# Patient Record
Sex: FEMALE | Race: WHITE | Employment: PART TIME | URBAN - METROPOLITAN AREA
[De-identification: names, ages, dates, MRNs, and addresses within clinical notes are randomized per-mention and may not be internally consistent; named-entity substitution may affect disease eponyms.]

---

## 2021-01-22 ENCOUNTER — HOSPITAL ENCOUNTER (EMERGENCY)
Age: 31
Discharge: HOME | End: 2021-01-22
Attending: EMERGENCY MEDICINE
Payer: MEDICAID

## 2021-01-22 VITALS
RESPIRATION RATE: 16 BRPM | TEMPERATURE: 98.1 F | HEIGHT: 60 IN | DIASTOLIC BLOOD PRESSURE: 73 MMHG | HEART RATE: 74 BPM | WEIGHT: 223.11 LBS | BODY MASS INDEX: 43.8 KG/M2 | OXYGEN SATURATION: 99 % | SYSTOLIC BLOOD PRESSURE: 123 MMHG

## 2021-01-22 DIAGNOSIS — J06.9 VIRAL URI WITH COUGH: Primary | ICD-10-CM

## 2021-01-22 DIAGNOSIS — Z20.822 SUSPECTED COVID-19 VIRUS INFECTION: ICD-10-CM

## 2021-01-22 LAB — SARS-COV-2, COV2: NORMAL

## 2021-01-22 PROCEDURE — U0005 INFEC AGEN DETEC AMPLI PROBE: HCPCS

## 2021-01-22 PROCEDURE — 99282 EMERGENCY DEPT VISIT SF MDM: CPT

## 2021-01-22 PROCEDURE — U0003 INFECTIOUS AGENT DETECTION BY NUCLEIC ACID (DNA OR RNA); SEVERE ACUTE RESPIRATORY SYNDROME CORONAVIRUS 2 (SARS-COV-2) (CORONAVIRUS DISEASE [COVID-19]), AMPLIFIED PROBE TECHNIQUE, MAKING USE OF HIGH THROUGHPUT TECHNOLOGIES AS DESCRIBED BY CMS-2020-01-R: HCPCS

## 2021-01-22 NOTE — Clinical Note
[address] 
[facility] 
[address] [address] [phone] Work/School Note Date: 1/22/2021 To Whom It May concern: 
 
Francy Valdivia was evaulated by the following provider(s): 
Attending Provider: Ashley Fonseca [address] [place] [address] virus is suspected. Per the CDC guidelines we recommend home isolation until the following conditions are all met: 1. At least 10 days have passed since symptoms first appeared and 2. At least 24 hours have passed since last fever without the use of fever-reducing medications and 
3. Symptoms (e.g., cough, shortness of breath) have improved Sincerely, 
 
 
 
 
Alysa Arevalo MD

## 2021-01-22 NOTE — ED NOTES
Dr. Christiano Claros and I have reviewed discharge instructions with the patient. The patient verbalized understanding.

## 2021-01-22 NOTE — ED PROVIDER NOTES
The history is provided by the patient. No  was used. Cough  This is a new problem. The current episode started more than 2 days ago. The problem occurs every few hours. The problem has not changed since onset. There has been a fever of 100 - 100.9 F. The fever has been present for less than 1 day. Associated symptoms include chills, sweats, headaches, myalgias, shortness of breath and nausea. Pertinent negatives include no chest pain, no weight loss, no eye redness, no ear congestion, no ear pain, no rhinorrhea, no sore throat, no wheezing, no vomiting and no confusion. She has tried nothing for the symptoms. The treatment provided no relief. She is not a smoker. Her past medical history does not include bronchitis, pneumonia, bronchiectasis, COPD, emphysema, asthma, cancer, heart failure or CHF. History reviewed. No pertinent past medical history. Past Surgical History:   Procedure Laterality Date    HX GYN               History reviewed. No pertinent family history.     Social History     Socioeconomic History    Marital status: Not on file     Spouse name: Not on file    Number of children: Not on file    Years of education: Not on file    Highest education level: Not on file   Occupational History    Not on file   Social Needs    Financial resource strain: Not on file    Food insecurity     Worry: Not on file     Inability: Not on file    Transportation needs     Medical: Not on file     Non-medical: Not on file   Tobacco Use    Smoking status: Former Smoker    Smokeless tobacco: Never Used    Tobacco comment: stopped    Substance and Sexual Activity    Alcohol use: Not Currently    Drug use: Never    Sexual activity: Yes   Lifestyle    Physical activity     Days per week: Not on file     Minutes per session: Not on file    Stress: Not on file   Relationships    Social connections     Talks on phone: Not on file     Gets together: Not on file Attends Yazidism service: Not on file     Active member of club or organization: Not on file     Attends meetings of clubs or organizations: Not on file     Relationship status: Not on file    Intimate partner violence     Fear of current or ex partner: Not on file     Emotionally abused: Not on file     Physically abused: Not on file     Forced sexual activity: Not on file   Other Topics Concern    Not on file   Social History Narrative    Not on file         ALLERGIES: Patient has no known allergies. Review of Systems   Constitutional: Positive for chills. Negative for activity change, fever and weight loss. HENT: Negative for ear pain, nosebleeds, rhinorrhea, sore throat, trouble swallowing and voice change. Eyes: Negative for redness and visual disturbance. Respiratory: Positive for cough and shortness of breath. Negative for wheezing. Cardiovascular: Negative for chest pain and palpitations. Gastrointestinal: Positive for nausea. Negative for abdominal pain, constipation, diarrhea and vomiting. Genitourinary: Negative for difficulty urinating, dysuria, hematuria and urgency. Musculoskeletal: Positive for myalgias. Negative for back pain, neck pain and neck stiffness. Skin: Negative for color change. Allergic/Immunologic: Negative for immunocompromised state. Neurological: Positive for headaches. Negative for dizziness, seizures, syncope, weakness, light-headedness and numbness. Psychiatric/Behavioral: Negative for behavioral problems, confusion, hallucinations, self-injury and suicidal ideas. Vitals:    01/22/21 0940   BP: 123/73   Pulse: 74   Resp: 16   Temp: 98.1 °F (36.7 °C)   SpO2: 99%   Weight: 101.2 kg (223 lb 1.7 oz)   Height: 5' (1.524 m)            Physical Exam  Vitals signs and nursing note reviewed. Constitutional:       General: She is not in acute distress. Appearance: She is well-developed. She is not diaphoretic. HENT:      Head: Atraumatic.    Neck: Trachea: No tracheal deviation. Cardiovascular:      Rate and Rhythm: Normal rate and regular rhythm. Heart sounds: Normal heart sounds. Comments: Warm and well perfused  Pulmonary:      Effort: Pulmonary effort is normal. No respiratory distress. Breath sounds: Normal breath sounds. Musculoskeletal: Normal range of motion. Skin:     General: Skin is warm and dry. Neurological:      Mental Status: She is alert. Coordination: Coordination normal.   Psychiatric:         Behavior: Behavior normal.         Thought Content: Thought content normal.         Judgment: Judgment normal.          MDM     This is a 65-year-old female, G2, P1 at approximately 8 weeks gestation, presenting with complaints of 3 days of nonproductive cough, headache, myalgias, nausea, loose bowel movements, and elevated temperatures without fever. 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 She states she has been exposed to family members, also known to be exposed to coronavirus. She endorses having symptoms of morning sickness during her early pregnancy, is scheduled to follow with her OB today, however is concerned she has contracted coronavirus. Physical exam is remarkable for a well-appearing obese young female, in no acute distress with clear breath sounds, regular rate and rhythm without murmurs gallops or rubs. She is noted to be afebrile, normotensive without tachycardia, satting 99% on room air. Discussed with patient symptomatic care for upper respiratory infection, safe to use Tylenol. Patient is refusing chest x-ray due to pregnancy, which I feel is reasonable, will provide swab for coronavirus, recommend follow-up as scheduled, continued cessation from tobacco use, return precautions given.     Procedures

## 2021-01-22 NOTE — ED TRIAGE NOTES
Pt. Here for concern for  covid and is 8 weeks pregnant. Pt. States she started coughing x2 days, headache, stuffy nose, chills at night, Pt. Isn't currently taking any tylenol. She's here to be tested.

## 2021-01-23 ENCOUNTER — PATIENT OUTREACH (OUTPATIENT)
Dept: CASE MANAGEMENT | Age: 31
End: 2021-01-23

## 2021-01-23 NOTE — PROGRESS NOTES
Patient contacted regarding recent discharge and COVID-19 risk. Discussed COVID-19 related testing which was available at this time. Test results were negative. 01/25/21   Attempted to contact patient on 1/23/21 and 1/25/21 without success.     Danelle Serrano MSN, RN, [facility] [place] [facility]  Care Transition Nurse

## 2021-01-24 LAB
COVID-19, XGCOVT: NOT DETECTED
HEALTH STATUS, XMCV2T: NORMAL
SPECIMEN TYPE, XMCV1T: NORMAL

## 2021-02-01 ENCOUNTER — PATIENT OUTREACH (OUTPATIENT)
Dept: CASE MANAGEMENT | Age: 31
End: 2021-02-01

## 2021-02-01 NOTE — PROGRESS NOTES
02/01/21   Patient attempted to be contacted on 1/23, 1/25 and 2/1/21 with out success. Sherly DAY, RN, [facility]  Care Transition Nurse    Patient resolved from Transition of Care episode on 2/1/21. ACM/CTN was unsuccessful at contacting this patient today. Patient has not had any additional ED or hospital visits. No further outreach scheduled with this CTN/ACM. Episode of Care resolved. Patient has this CTN/ACM contact information if future needs arise.

## 2022-07-19 DIAGNOSIS — G11.11: Primary | ICD-10-CM

## 2022-07-19 DIAGNOSIS — Z99.3 WHEELCHAIR DEPENDENT: ICD-10-CM

## 2022-07-19 DIAGNOSIS — R53.1 GENERALIZED WEAKNESS: ICD-10-CM
